# Patient Record
Sex: FEMALE | Race: WHITE | NOT HISPANIC OR LATINO | Employment: FULL TIME | URBAN - METROPOLITAN AREA
[De-identification: names, ages, dates, MRNs, and addresses within clinical notes are randomized per-mention and may not be internally consistent; named-entity substitution may affect disease eponyms.]

---

## 2023-04-28 ENCOUNTER — OFFICE VISIT (OUTPATIENT)
Dept: PRIMARY CARE | Facility: CLINIC | Age: 43
End: 2023-04-28
Payer: COMMERCIAL

## 2023-04-28 VITALS
TEMPERATURE: 98 F | HEART RATE: 76 BPM | WEIGHT: 128 LBS | DIASTOLIC BLOOD PRESSURE: 70 MMHG | OXYGEN SATURATION: 98 % | SYSTOLIC BLOOD PRESSURE: 116 MMHG

## 2023-04-28 DIAGNOSIS — R22.41 LOCALIZED SWELLING, MASS AND LUMP, LOWER LIMB, RIGHT: Primary | ICD-10-CM

## 2023-04-28 PROBLEM — Q85.89: Status: ACTIVE | Noted: 2019-02-21

## 2023-04-28 PROBLEM — M54.81 CERVICO-OCCIPITAL NEURALGIA: Status: ACTIVE | Noted: 2023-04-28

## 2023-04-28 PROBLEM — M54.12: Status: RESOLVED | Noted: 2023-04-28 | Resolved: 2023-04-28

## 2023-04-28 PROBLEM — M54.12: Status: ACTIVE | Noted: 2023-04-28

## 2023-04-28 PROBLEM — Z15.89 MTHFR GENE MUTATION: Status: ACTIVE | Noted: 2023-04-28

## 2023-04-28 PROBLEM — Q85.89: Chronic | Status: ACTIVE | Noted: 2019-02-21

## 2023-04-28 PROCEDURE — 99213 OFFICE O/P EST LOW 20 MIN: CPT | Performed by: FAMILY MEDICINE

## 2023-04-28 PROCEDURE — 1036F TOBACCO NON-USER: CPT | Performed by: FAMILY MEDICINE

## 2023-04-28 RX ORDER — GABAPENTIN 100 MG/1
100 CAPSULE ORAL 3 TIMES DAILY
COMMUNITY

## 2023-04-28 NOTE — PROGRESS NOTES
Subjective   Patient ID: Carla Madden is a 43 y.o. female who presents for bump on right roque x 1 wk nki.    Fannie noticed a lesion on her right anterior shin.  She first noticed this about a week ago.  It is not painful.  It is not enlarged or draining any material.First noticed while walking in [place].          Review of Systems   Constitutional:  Negative for chills and fever.   HENT:  Negative for congestion.    Respiratory:  Negative for cough.        Objective   /70   Pulse 76   Temp 36.7 °C (98 °F)   Wt 58.1 kg (128 lb)   SpO2 98%     Physical Exam  Constitutional:       General: She is not in acute distress.  HENT:      Head: Normocephalic and atraumatic.   Eyes:      Extraocular Movements: Extraocular movements intact.   Pulmonary:      Effort: Pulmonary effort is normal.   Skin:     Comments: Small cyst like lesion at right anterior shin.  Not attached to bone.  Easily mobile.  No associated erythema or petechiae.   Neurological:      Mental Status: She is alert.   Psychiatric:         Mood and Affect: Mood normal.         Assessment/Plan   Problem List Items Addressed This Visit          Other    Localized swelling, mass and lump, lower limb, right - Primary     Possible dermal cyst.  We will check x-ray to ensure no bony lesion, since patient first noticed this while walking more than usual in [place].  Could also consider dermatology consult.         Relevant Orders    XR tibia fibula right 2 views

## 2023-04-30 PROBLEM — R22.41: Status: ACTIVE | Noted: 2023-04-30

## 2023-04-30 ASSESSMENT — ENCOUNTER SYMPTOMS
CHILLS: 0
FEVER: 0
COUGH: 0

## 2023-04-30 NOTE — ASSESSMENT & PLAN NOTE
Possible dermal cyst.  We will check x-ray to ensure no bony lesion, since patient first noticed this while walking more than usual in [place].  Could also consider dermatology consult.

## 2023-05-02 ENCOUNTER — HOSPITAL ENCOUNTER (OUTPATIENT)
Dept: DATA CONVERSION | Facility: HOSPITAL | Age: 43
End: 2023-05-02
Attending: INTERNAL MEDICINE | Admitting: INTERNAL MEDICINE
Payer: COMMERCIAL

## 2023-05-02 DIAGNOSIS — Z12.11 ENCOUNTER FOR SCREENING FOR MALIGNANT NEOPLASM OF COLON: ICD-10-CM

## 2023-05-02 DIAGNOSIS — K64.0 FIRST DEGREE HEMORRHOIDS: ICD-10-CM

## 2023-05-02 DIAGNOSIS — Z91.041 RADIOGRAPHIC DYE ALLERGY STATUS: ICD-10-CM

## 2023-05-08 LAB — HCG, URINE: NEGATIVE

## 2023-09-05 ENCOUNTER — TELEPHONE (OUTPATIENT)
Dept: PRIMARY CARE | Facility: CLINIC | Age: 43
End: 2023-09-05
Payer: COMMERCIAL

## 2023-09-05 NOTE — TELEPHONE ENCOUNTER
Patient is asking for Recommendation from J for a doctor for headaches from her neck pain. Was seeing Neuro in [place] and doesn't want to drive to [place]. Does [facility] know anyone around here?

## 2023-09-06 NOTE — TELEPHONE ENCOUNTER
I know neurology was managing her occipital neuralgia. Unfortunately 2 of 3 neurologists in [facility] recently retired so Dr. Crowley is only one taking patient.s If having issue with neck pain, may want to consider osteopathic manipulation which is a treatment that I can do to reduce tension in neck muscles. Physical therapy would be another option to consider. We can discuss further at office visit if she'd like.

## 2023-09-07 VITALS — WEIGHT: 125.22 LBS | HEIGHT: 66 IN | BODY MASS INDEX: 20.12 KG/M2

## 2023-09-13 ENCOUNTER — OFFICE VISIT (OUTPATIENT)
Dept: PRIMARY CARE | Facility: CLINIC | Age: 43
End: 2023-09-13
Payer: COMMERCIAL

## 2023-09-13 VITALS
WEIGHT: 125 LBS | HEART RATE: 80 BPM | DIASTOLIC BLOOD PRESSURE: 60 MMHG | TEMPERATURE: 97.8 F | BODY MASS INDEX: 20.23 KG/M2 | SYSTOLIC BLOOD PRESSURE: 98 MMHG | OXYGEN SATURATION: 100 %

## 2023-09-13 DIAGNOSIS — R51.9 FACIAL PAIN: ICD-10-CM

## 2023-09-13 DIAGNOSIS — M54.81 OCCIPITAL NEURALGIA OF LEFT SIDE: Primary | ICD-10-CM

## 2023-09-13 DIAGNOSIS — Z23 NEEDS FLU SHOT: ICD-10-CM

## 2023-09-13 DIAGNOSIS — G44.86 CERVICOGENIC HEADACHE: ICD-10-CM

## 2023-09-13 PROBLEM — G43.909 MIGRAINES: Status: ACTIVE | Noted: 2021-10-04

## 2023-09-13 PROBLEM — E55.9 VITAMIN D DEFICIENCY: Status: ACTIVE | Noted: 2021-10-04

## 2023-09-13 PROCEDURE — 90471 IMMUNIZATION ADMIN: CPT | Performed by: FAMILY MEDICINE

## 2023-09-13 PROCEDURE — 99214 OFFICE O/P EST MOD 30 MIN: CPT | Performed by: FAMILY MEDICINE

## 2023-09-13 PROCEDURE — 1036F TOBACCO NON-USER: CPT | Performed by: FAMILY MEDICINE

## 2023-09-13 PROCEDURE — 90686 IIV4 VACC NO PRSV 0.5 ML IM: CPT | Performed by: FAMILY MEDICINE

## 2023-09-13 ASSESSMENT — ENCOUNTER SYMPTOMS
DIZZINESS: 0
NUMBNESS: 0
HEADACHES: 1
WEAKNESS: 0

## 2023-09-13 NOTE — PATIENT INSTRUCTIONS
- I sent referral to Dr. Crowley   - Dr. Olivas at [facility] is another option    For PT: [facility], [address] [place] [address], [id number], [place] [phone]

## 2023-09-13 NOTE — PROGRESS NOTES
Subjective   Patient ID: Carla Madden is a 43 y.o. female who presents for Neck Pain (Discuss neck and facial pain- discuss referral to specialist) and Facial Pain.    Fannie presents to discus head pain and neck pain. She has had chronic issues with pain. Diagnosed with occipital neuralgia. Had injections in past. Was following with neuro in [place] when worked there but now needs new neurologist due to location change. Still getting pain in back of head. Also has intermittent facial pain. Told by neuro that a component of cervicogenic headache as well. Gabapentin controls symptoms most days but still has breakthrough pain.          Review of Systems   Neurological:  Positive for headaches. Negative for dizziness, weakness and numbness.       Objective   BP 98/60   Pulse 80   Temp 36.6 °C (97.8 °F)   Wt 56.7 kg (125 lb)   SpO2 100%   BMI 20.23 kg/m²     Physical Exam  Constitutional:       General: She is not in acute distress.     Appearance: Normal appearance.   HENT:      Head: Normocephalic.   Pulmonary:      Effort: Pulmonary effort is normal.   Neurological:      General: No focal deficit present.      Mental Status: She is alert.   Psychiatric:         Mood and Affect: Mood normal.         Assessment/Plan   Diagnoses and all orders for this visit:  Occipital neuralgia of left side  Comments:  Refer to new neurologist (transferring from [facility] neuro). Continue gabapentin. Ok to call for refils opf gabapentin if running out.  Orders:  -     Referral to Neurology; Future  -     Referral to Physical Therapy; Future  Facial pain  -     Referral to Neurology; Future  -     Referral to Physical Therapy; Future  Cervicogenic headache  Comments:  Refer to PT.  Orders:  -     Referral to Physical Therapy; Future  Needs flu shot  -     Flu vaccine (IIV4) age 6 months and greater, preservative free

## 2023-12-22 ENCOUNTER — APPOINTMENT (OUTPATIENT)
Dept: PRIMARY CARE | Facility: CLINIC | Age: 43
End: 2023-12-22
Payer: COMMERCIAL

## 2024-03-31 ENCOUNTER — LAB REQUISITION (OUTPATIENT)
Dept: LAB | Facility: HOSPITAL | Age: 44
End: 2024-03-31
Payer: COMMERCIAL

## 2024-03-31 DIAGNOSIS — N39.0 URINARY TRACT INFECTION, SITE NOT SPECIFIED: ICD-10-CM

## 2024-03-31 PROCEDURE — 87086 URINE CULTURE/COLONY COUNT: CPT

## 2024-04-02 LAB — BACTERIA UR CULT: NORMAL

## 2024-07-02 ENCOUNTER — HOSPITAL ENCOUNTER (OUTPATIENT)
Dept: RADIOLOGY | Facility: CLINIC | Age: 44
Discharge: HOME | End: 2024-07-02
Payer: COMMERCIAL

## 2024-07-02 VITALS — WEIGHT: 125 LBS | BODY MASS INDEX: 20.83 KG/M2 | HEIGHT: 65 IN

## 2024-07-02 DIAGNOSIS — Z12.31 SCREENING MAMMOGRAM FOR BREAST CANCER: ICD-10-CM

## 2024-07-02 PROCEDURE — 77063 BREAST TOMOSYNTHESIS BI: CPT | Performed by: RADIOLOGY

## 2024-07-02 PROCEDURE — 77067 SCR MAMMO BI INCL CAD: CPT

## 2024-07-02 PROCEDURE — 77067 SCR MAMMO BI INCL CAD: CPT | Performed by: RADIOLOGY

## 2024-07-10 DIAGNOSIS — Z13.220 SCREENING FOR HYPERLIPIDEMIA: Primary | ICD-10-CM

## 2024-07-10 DIAGNOSIS — Z13.1 SCREENING FOR DIABETES MELLITUS: ICD-10-CM

## 2024-07-10 DIAGNOSIS — Z13.0 SCREENING FOR DEFICIENCY ANEMIA: ICD-10-CM

## 2024-07-12 ENCOUNTER — OFFICE VISIT (OUTPATIENT)
Dept: PRIMARY CARE | Facility: CLINIC | Age: 44
End: 2024-07-12
Payer: COMMERCIAL

## 2024-07-12 VITALS
TEMPERATURE: 97.6 F | DIASTOLIC BLOOD PRESSURE: 78 MMHG | HEIGHT: 65 IN | OXYGEN SATURATION: 99 % | WEIGHT: 128 LBS | HEART RATE: 81 BPM | BODY MASS INDEX: 21.33 KG/M2 | SYSTOLIC BLOOD PRESSURE: 122 MMHG

## 2024-07-12 DIAGNOSIS — M54.81 OCCIPITAL NEURALGIA OF LEFT SIDE: Chronic | ICD-10-CM

## 2024-07-12 DIAGNOSIS — G44.86 CERVICOGENIC HEADACHE: Primary | ICD-10-CM

## 2024-07-12 PROCEDURE — 99214 OFFICE O/P EST MOD 30 MIN: CPT | Performed by: FAMILY MEDICINE

## 2024-07-12 PROCEDURE — 1036F TOBACCO NON-USER: CPT | Performed by: FAMILY MEDICINE

## 2024-07-12 RX ORDER — CYCLOBENZAPRINE HCL 5 MG
5 TABLET ORAL NIGHTLY PRN
Qty: 30 TABLET | Refills: 1 | Status: SHIPPED | OUTPATIENT
Start: 2024-07-12 | End: 2024-09-10

## 2024-07-12 RX ORDER — GABAPENTIN 100 MG/1
100 CAPSULE ORAL EVERY MORNING
Start: 2024-07-12

## 2024-07-12 RX ORDER — GABAPENTIN 300 MG/1
300 CAPSULE ORAL NIGHTLY
Qty: 90 CAPSULE | Refills: 1 | Status: SHIPPED | OUTPATIENT
Start: 2024-07-12 | End: 2025-01-08

## 2024-07-12 ASSESSMENT — ENCOUNTER SYMPTOMS
COUGH: 0
DIZZINESS: 0
NECK PAIN: 1
HEADACHES: 1

## 2024-07-12 NOTE — PATIENT INSTRUCTIONS
Check with your insurance to see if osteopathic manipulation is covered. CPT (procedure code): 49392

## 2024-07-12 NOTE — PROGRESS NOTES
"Subjective   Patient ID: Fannie Madden is a 44 y.o. female who presents for Neck Pain (Neck and facial pain recheck   NKI).    Fannie has been having worsening of her pain that is located in the back part of her head.  It is mostly located in her left neck and then radiates up the left side of her head.  Occasionally the pain does also impact the left side of her face.  She has been following with neurology for this issue for years.  Is trying to get back into her neurologist but was unable to get an appointment for 3 months.  She wants to catch the pain now before it gets worse.  At this time pain is worse on the left side of her neck.  Feels like a squeezing sensation.  She does take gabapentin 100 mg in the morning and 200 in the evening, but she is still having symptoms.         Review of Systems   HENT:  Negative for congestion.    Respiratory:  Negative for cough.    Musculoskeletal:  Positive for neck pain.   Neurological:  Positive for headaches. Negative for dizziness.       Objective   /78   Pulse 81   Temp 36.4 °C (97.6 °F)   Ht 1.651 m (5' 5\")   Wt 58.1 kg (128 lb)   SpO2 99%   BMI 21.30 kg/m²     Physical Exam  Constitutional:       General: She is not in acute distress.     Appearance: Normal appearance.   HENT:      Head: Normocephalic.   Pulmonary:      Effort: Pulmonary effort is normal.   Musculoskeletal:      Cervical back: Spasms (Left levator scapula.) and tenderness present. No swelling, rigidity or bony tenderness. No pain with movement. Decreased range of motion.   Skin:     General: Skin is warm and dry.   Neurological:      General: No focal deficit present.      Mental Status: She is alert.      Cranial Nerves: No cranial nerve deficit.      Motor: No weakness.      Coordination: Coordination normal.   Psychiatric:         Mood and Affect: Mood normal.         Assessment/Plan   Diagnoses and all orders for this visit:  Cervicogenic headache  Comments:  Start cyclobenzaprine " as needed.  Refer to PT.  Consider osteopathic manipulation.  Orders:  -     cyclobenzaprine (Flexeril) 5 mg tablet; Take 1 tablet (5 mg) by mouth as needed at bedtime for muscle spasms.  -     Referral to Physical Therapy; Future  Occipital neuralgia of left side  Comments:  Recommend increase evening dose of gabapentin to 300 mg.  Continue 100 mg morning.  Orders:  -     gabapentin (Neurontin) 300 mg capsule; Take 1 capsule (300 mg) by mouth once daily at bedtime.  -     Referral to Physical Therapy; Future  -     gabapentin (Neurontin) 100 mg capsule; Take 1 capsule (100 mg) by mouth once daily in the morning.

## 2024-07-23 ENCOUNTER — APPOINTMENT (OUTPATIENT)
Dept: PRIMARY CARE | Facility: CLINIC | Age: 44
End: 2024-07-23
Payer: COMMERCIAL

## 2024-07-23 VITALS
WEIGHT: 129 LBS | DIASTOLIC BLOOD PRESSURE: 68 MMHG | TEMPERATURE: 97.3 F | BODY MASS INDEX: 20.73 KG/M2 | HEART RATE: 76 BPM | SYSTOLIC BLOOD PRESSURE: 116 MMHG | HEIGHT: 66 IN | OXYGEN SATURATION: 100 %

## 2024-07-23 DIAGNOSIS — N81.6 RECTOCELE: ICD-10-CM

## 2024-07-23 DIAGNOSIS — Z13.220 SCREENING FOR HYPERLIPIDEMIA: ICD-10-CM

## 2024-07-23 DIAGNOSIS — Z23 NEED FOR HEPATITIS B VACCINATION: ICD-10-CM

## 2024-07-23 DIAGNOSIS — Z00.00 WELLNESS EXAMINATION: Primary | ICD-10-CM

## 2024-07-23 DIAGNOSIS — Z13.0 SCREENING FOR DEFICIENCY ANEMIA: ICD-10-CM

## 2024-07-23 DIAGNOSIS — Z13.1 SCREENING FOR DIABETES MELLITUS: ICD-10-CM

## 2024-07-23 DIAGNOSIS — Z23 NEED FOR MMR VACCINE: ICD-10-CM

## 2024-07-23 PROCEDURE — 90743 HEPB VACC 2 DOSE ADOLESC IM: CPT | Performed by: FAMILY MEDICINE

## 2024-07-23 PROCEDURE — 99396 PREV VISIT EST AGE 40-64: CPT | Performed by: FAMILY MEDICINE

## 2024-07-23 PROCEDURE — 90472 IMMUNIZATION ADMIN EACH ADD: CPT | Performed by: FAMILY MEDICINE

## 2024-07-23 PROCEDURE — 3008F BODY MASS INDEX DOCD: CPT | Performed by: FAMILY MEDICINE

## 2024-07-23 PROCEDURE — 1036F TOBACCO NON-USER: CPT | Performed by: FAMILY MEDICINE

## 2024-07-23 PROCEDURE — 90707 MMR VACCINE SC: CPT | Performed by: FAMILY MEDICINE

## 2024-07-23 PROCEDURE — 90471 IMMUNIZATION ADMIN: CPT | Performed by: FAMILY MEDICINE

## 2024-07-23 ASSESSMENT — ENCOUNTER SYMPTOMS
RHINORRHEA: 0
CONSTIPATION: 1
PALPITATIONS: 0
VOMITING: 0
DIZZINESS: 0
SLEEP DISTURBANCE: 0
APPETITE CHANGE: 0
SORE THROAT: 0
ABDOMINAL PAIN: 0
NAUSEA: 0
FATIGUE: 0
SHORTNESS OF BREATH: 0
DIARRHEA: 0
CHILLS: 0
FEVER: 0
VOICE CHANGE: 0
COUGH: 0
DYSURIA: 0

## 2024-07-23 ASSESSMENT — PATIENT HEALTH QUESTIONNAIRE - PHQ9
2. FEELING DOWN, DEPRESSED OR HOPELESS: NOT AT ALL
1. LITTLE INTEREST OR PLEASURE IN DOING THINGS: NOT AT ALL
SUM OF ALL RESPONSES TO PHQ9 QUESTIONS 1 AND 2: 0

## 2024-07-23 NOTE — PROGRESS NOTES
"Subjective   Patient ID: Fannie Madden is a 44 y.o. female who presents for Annual Exam (Cpe.).    Fannie presents for wellness exam. She has been feeling good. Neck and head pain improving. Only issue she has is constipation and feeling bulge in vaginal area. Sometimes has to push it back in. Eats a healthy high fiber diet.              Review of Systems   Constitutional:  Negative for appetite change, chills, fatigue and fever.   HENT:  Negative for congestion, rhinorrhea, sore throat and voice change.    Eyes:  Negative for visual disturbance.   Respiratory:  Negative for cough and shortness of breath.    Cardiovascular:  Negative for chest pain and palpitations.   Gastrointestinal:  Positive for constipation. Negative for abdominal pain, diarrhea, nausea and vomiting.   Genitourinary:  Negative for dysuria.   Skin:  Negative for rash.   Neurological:  Negative for dizziness.   Psychiatric/Behavioral:  Negative for sleep disturbance.        Objective   /68   Pulse 76   Temp 36.3 °C (97.3 °F)   Ht 1.676 m (5' 6\")   Wt 58.5 kg (129 lb)   SpO2 100%   BMI 20.82 kg/m²     Physical Exam  Constitutional:       General: She is not in acute distress.  HENT:      Head: Normocephalic and atraumatic.      Right Ear: Tympanic membrane normal.      Left Ear: Tympanic membrane normal.      Nose: No congestion or rhinorrhea.      Mouth/Throat:      Mouth: Mucous membranes are moist.      Pharynx: No oropharyngeal exudate or posterior oropharyngeal erythema.   Eyes:      Extraocular Movements: Extraocular movements intact.   Cardiovascular:      Rate and Rhythm: Normal rate and regular rhythm.      Heart sounds: No murmur heard.  Pulmonary:      Effort: Pulmonary effort is normal.      Breath sounds: No wheezing or rhonchi.   Abdominal:      General: There is no distension.      Palpations: Abdomen is soft.      Tenderness: There is no abdominal tenderness. There is no guarding or rebound.   Musculoskeletal:        "  General: No swelling or tenderness.      Cervical back: Neck supple.      Right lower leg: No edema.      Left lower leg: No edema.   Lymphadenopathy:      Cervical: No cervical adenopathy.   Skin:     General: Skin is warm and dry.   Neurological:      Mental Status: She is alert.      Cranial Nerves: No cranial nerve deficit.      Motor: No weakness.      Coordination: Coordination normal.      Gait: Gait normal.   Psychiatric:         Mood and Affect: Mood normal.         Behavior: Behavior normal.         Assessment/Plan   Diagnoses and all orders for this visit:  Wellness examination  Comments:  Mammogram, colonoscopy and Pap up-to-date. Hepatitis B and MMR boosters given today.  Rectocele  Comments:  Suspected rectocele based on symptoms. Discussed referral to UroGyn but patient would like to wait. Patient will be seeing her OBGYN for pelvic exam soon.  Need for hepatitis B vaccination  -     Hepatitis B vaccine, 20 yrs and older (RECOMBIVAX, ENGERIX)  Need for MMR vaccine  -     MMR vaccine, subcutaneous (MMR II)  Screening for hyperlipidemia  -     Lipid Panel; Future  Screening for diabetes mellitus  -     Comprehensive Metabolic Panel; Future  Screening for deficiency anemia  -     CBC and Auto Differential; Future

## 2024-09-24 ENCOUNTER — OFFICE VISIT (OUTPATIENT)
Dept: URGENT CARE | Age: 44
End: 2024-09-24
Payer: COMMERCIAL

## 2024-09-24 VITALS
HEART RATE: 84 BPM | SYSTOLIC BLOOD PRESSURE: 145 MMHG | DIASTOLIC BLOOD PRESSURE: 88 MMHG | OXYGEN SATURATION: 98 % | TEMPERATURE: 98.2 F

## 2024-09-24 DIAGNOSIS — R52 BODY ACHES: ICD-10-CM

## 2024-09-24 DIAGNOSIS — R53.83 OTHER FATIGUE: ICD-10-CM

## 2024-09-24 DIAGNOSIS — R10.12 LUQ PAIN: Primary | ICD-10-CM

## 2024-09-24 DIAGNOSIS — J02.9 SORE THROAT: ICD-10-CM

## 2024-09-24 LAB — POC RAPID MONO: NEGATIVE

## 2024-09-24 ASSESSMENT — ENCOUNTER SYMPTOMS
FEVER: 1
SORE THROAT: 1
ABDOMINAL PAIN: 1

## 2024-09-24 NOTE — PROGRESS NOTES
"Subjective   Patient ID: Carla Madden \"Ernestine" is a 44 y.o. female. They present today with a chief complaint of Abdominal Pain (LUQ pain, also sick w/ fever, sore throat).    History of Present Illness  A 44-year-old female arrives to clinic with chief complaint of left upper quadrant pain, fevers, chills, sore throat, body aches, fatigue.  The patient reports having the symptoms over the last 7 days.  She reports that her entire family has been sick however all have tested negative.  She reports no history of gastrointestinal abnormalities.  She has not taken any over-the-counter medications other than Advil yesterday.  She is here for further evaluation and health maintenance.  Abdominal Pain  Associated symptoms include a fever.       Past Medical History  Allergies as of 09/24/2024 - Reviewed 09/24/2024   Allergen Reaction Noted    Iodine Hives 04/28/2023    Iodinated contrast media Hives 08/08/2013    Sulfamethoxazole-trimethoprim Hives and Rash 03/08/2016    Tylenol [acetaminophen] Other 04/28/2023       (Not in a hospital admission)       Past Medical History:   Diagnosis Date    Calculus of kidney     Recurrent kidney stones    Genetic susceptibility to other disease     MTHFR mutation    Occipital neuralgia     Occipital neuralgia    Personal history of other diseases of the nervous system and sense organs     History of migraine       Past Surgical History:   Procedure Laterality Date    BLADDER SUSPENSION      FOOT SURGERY      LITHOTRIPSY      LYMPH NODE BIOPSY      SHOULDER SURGERY Right     TUBAL LIGATION          reports that she has never smoked. She has never used smokeless tobacco. She reports current alcohol use of about 3.0 - 4.0 standard drinks of alcohol per week. She reports that she does not use drugs.    Review of Systems  Review of Systems   Constitutional:  Positive for fever.   HENT:  Positive for sore throat.    Gastrointestinal:  Positive for abdominal pain.       Objective  "   Vitals:    09/24/24 1338   BP: 145/88   Pulse: 84   Temp: 36.8 °C (98.2 °F)   SpO2: 98%     No LMP recorded.    Physical Exam  Vitals and nursing note reviewed.   Constitutional:       Appearance: Normal appearance.   HENT:      Head: Normocephalic and atraumatic.      Right Ear: Tympanic membrane normal.      Left Ear: Tympanic membrane normal.      Nose: Nose normal.      Mouth/Throat:      Mouth: Mucous membranes are moist.      Pharynx: Oropharynx is clear.   Eyes:      Extraocular Movements: Extraocular movements intact.      Conjunctiva/sclera: Conjunctivae normal.      Pupils: Pupils are equal, round, and reactive to light.   Cardiovascular:      Rate and Rhythm: Normal rate and regular rhythm.   Pulmonary:      Effort: Pulmonary effort is normal.      Breath sounds: Normal breath sounds.   Abdominal:      General: Bowel sounds are normal.      Palpations: Abdomen is soft.   Musculoskeletal:         General: Normal range of motion.      Cervical back: Normal range of motion and neck supple.   Skin:     General: Skin is warm.      Capillary Refill: Capillary refill takes less than 2 seconds.   Neurological:      General: No focal deficit present.      Mental Status: She is alert and oriented to person, place, and time. Mental status is at baseline.   Psychiatric:         Mood and Affect: Mood normal.         Behavior: Behavior normal.         Thought Content: Thought content normal.         Judgment: Judgment normal.         Procedures    Point of Care Test & Imaging Results from this visit  Results for orders placed or performed in visit on 09/24/24   POCT Infectious mononucleosis antibody manually resulted   Result Value Ref Range    POC Rapid Mono Negative Negative      No results found.    Diagnostic study results (if any) were reviewed by JULIANN Tyler.    Assessment/Plan   Allergies, medications, history, and pertinent labs/EKGs/Imaging reviewed by JULIANN Tyler.     Medical  Decision Making  Upon initial assessment, the patient was sitting calmly in bedside chair no acute distress.    Given the results, Monospot test was completed given her symptoms.  It was negative.  I educated patient for symptom management treatment such as Tylenol, ibuprofen, Claritin, and Flonase.  Follow-up with primary care provider as they are able to do blood work, ultrasound, or CT abdomen pelvis with IV contrast.  For any worsening signs and symptoms, head to the emergency department.  Patient agrees to plan of care was discharged stable condition.    As a result of the work-up, the patient was discharged home.  she was informed of her diagnosis and instructed to come back with any concerns or worsening of condition.  she and was agreeable to the plan as discussed above.  she was given the opportunity to ask questions.  All of the patient's questions were answered.    This document was generated using the assistance of voice recognition software. If there are any errors of spelling, grammar, syntax, or meaning; please feel free to contact me directly for clarification.     Orders and Diagnoses  Diagnoses and all orders for this visit:  LUQ pain  -     POCT Infectious mononucleosis antibody manually resulted  Other fatigue  -     POCT Infectious mononucleosis antibody manually resulted  Sore throat  -     POCT Infectious mononucleosis antibody manually resulted  Body aches  -     POCT Infectious mononucleosis antibody manually resulted      Medical Admin Record      Patient disposition: Home    Electronically signed by JULIANN yTler  1:56 PM

## 2024-09-26 ENCOUNTER — LAB (OUTPATIENT)
Dept: LAB | Facility: LAB | Age: 44
End: 2024-09-26
Payer: COMMERCIAL

## 2024-09-26 DIAGNOSIS — Z13.1 SCREENING FOR DIABETES MELLITUS: ICD-10-CM

## 2024-09-26 DIAGNOSIS — Z13.0 SCREENING FOR DEFICIENCY ANEMIA: ICD-10-CM

## 2024-09-26 DIAGNOSIS — Z13.220 SCREENING FOR HYPERLIPIDEMIA: ICD-10-CM

## 2024-09-26 LAB
ALBUMIN SERPL BCP-MCNC: 4.2 G/DL (ref 3.4–5)
ALP SERPL-CCNC: 51 U/L (ref 33–110)
ALT SERPL W P-5'-P-CCNC: 7 U/L (ref 7–45)
ANION GAP SERPL CALC-SCNC: 9 MMOL/L (ref 10–20)
AST SERPL W P-5'-P-CCNC: 15 U/L (ref 9–39)
BASOPHILS # BLD AUTO: 0.01 X10*3/UL (ref 0–0.1)
BASOPHILS NFR BLD AUTO: 0.3 %
BILIRUB SERPL-MCNC: 0.5 MG/DL (ref 0–1.2)
BUN SERPL-MCNC: 10 MG/DL (ref 6–23)
CALCIUM SERPL-MCNC: 8.4 MG/DL (ref 8.6–10.3)
CHLORIDE SERPL-SCNC: 106 MMOL/L (ref 98–107)
CHOLEST SERPL-MCNC: 149 MG/DL (ref 0–199)
CHOLESTEROL/HDL RATIO: 2.4
CO2 SERPL-SCNC: 28 MMOL/L (ref 21–32)
CREAT SERPL-MCNC: 0.78 MG/DL (ref 0.5–1.05)
EGFRCR SERPLBLD CKD-EPI 2021: >90 ML/MIN/1.73M*2
EOSINOPHIL # BLD AUTO: 0.11 X10*3/UL (ref 0–0.7)
EOSINOPHIL NFR BLD AUTO: 3 %
ERYTHROCYTE [DISTWIDTH] IN BLOOD BY AUTOMATED COUNT: 11.8 % (ref 11.5–14.5)
EST. AVERAGE GLUCOSE BLD GHB EST-MCNC: 97 MG/DL
GLUCOSE SERPL-MCNC: 89 MG/DL (ref 74–99)
HBA1C MFR BLD: 5 %
HCT VFR BLD AUTO: 38.8 % (ref 36–46)
HDLC SERPL-MCNC: 62 MG/DL
HGB BLD-MCNC: 13.4 G/DL (ref 12–16)
IMM GRANULOCYTES # BLD AUTO: 0.01 X10*3/UL (ref 0–0.7)
IMM GRANULOCYTES NFR BLD AUTO: 0.3 % (ref 0–0.9)
LDLC SERPL CALC-MCNC: 73 MG/DL
LYMPHOCYTES # BLD AUTO: 0.97 X10*3/UL (ref 1.2–4.8)
LYMPHOCYTES NFR BLD AUTO: 26.4 %
MCH RBC QN AUTO: 33.4 PG (ref 26–34)
MCHC RBC AUTO-ENTMCNC: 34.5 G/DL (ref 32–36)
MCV RBC AUTO: 97 FL (ref 80–100)
MONOCYTES # BLD AUTO: 0.41 X10*3/UL (ref 0.1–1)
MONOCYTES NFR BLD AUTO: 11.1 %
NEUTROPHILS # BLD AUTO: 2.17 X10*3/UL (ref 1.2–7.7)
NEUTROPHILS NFR BLD AUTO: 58.9 %
NON HDL CHOLESTEROL: 87 MG/DL (ref 0–149)
NRBC BLD-RTO: 0 /100 WBCS (ref 0–0)
PLATELET # BLD AUTO: 217 X10*3/UL (ref 150–450)
POTASSIUM SERPL-SCNC: 3.9 MMOL/L (ref 3.5–5.3)
PROT SERPL-MCNC: 6.3 G/DL (ref 6.4–8.2)
RBC # BLD AUTO: 4.01 X10*6/UL (ref 4–5.2)
SODIUM SERPL-SCNC: 139 MMOL/L (ref 136–145)
TRIGL SERPL-MCNC: 70 MG/DL (ref 0–149)
VLDL: 14 MG/DL (ref 0–40)
WBC # BLD AUTO: 3.7 X10*3/UL (ref 4.4–11.3)

## 2024-09-26 PROCEDURE — 83036 HEMOGLOBIN GLYCOSYLATED A1C: CPT

## 2024-09-26 PROCEDURE — 80061 LIPID PANEL: CPT

## 2024-09-26 PROCEDURE — 85025 COMPLETE CBC W/AUTO DIFF WBC: CPT

## 2024-09-26 PROCEDURE — 36415 COLL VENOUS BLD VENIPUNCTURE: CPT

## 2024-09-26 PROCEDURE — 80053 COMPREHEN METABOLIC PANEL: CPT

## 2024-10-04 ENCOUNTER — OFFICE VISIT (OUTPATIENT)
Dept: PRIMARY CARE | Facility: CLINIC | Age: 44
End: 2024-10-04
Payer: COMMERCIAL

## 2024-10-04 VITALS
DIASTOLIC BLOOD PRESSURE: 74 MMHG | TEMPERATURE: 97.2 F | OXYGEN SATURATION: 98 % | BODY MASS INDEX: 20.5 KG/M2 | WEIGHT: 127 LBS | HEART RATE: 91 BPM | SYSTOLIC BLOOD PRESSURE: 122 MMHG

## 2024-10-04 DIAGNOSIS — Z23 NEEDS FLU SHOT: ICD-10-CM

## 2024-10-04 DIAGNOSIS — R10.12 LEFT UPPER QUADRANT PAIN: Primary | ICD-10-CM

## 2024-10-04 PROCEDURE — 90656 IIV3 VACC NO PRSV 0.5 ML IM: CPT | Performed by: FAMILY MEDICINE

## 2024-10-04 PROCEDURE — 1036F TOBACCO NON-USER: CPT | Performed by: FAMILY MEDICINE

## 2024-10-04 PROCEDURE — 90471 IMMUNIZATION ADMIN: CPT | Performed by: FAMILY MEDICINE

## 2024-10-04 PROCEDURE — 99213 OFFICE O/P EST LOW 20 MIN: CPT | Performed by: FAMILY MEDICINE

## 2024-10-04 ASSESSMENT — ENCOUNTER SYMPTOMS
FATIGUE: 1
NAUSEA: 0
ABDOMINAL PAIN: 1
DYSURIA: 0
CONSTIPATION: 0
FEVER: 0
DIARRHEA: 0
SHORTNESS OF BREATH: 0
VOMITING: 0
COUGH: 0
CHILLS: 0

## 2024-10-04 NOTE — PROGRESS NOTES
Subjective   Patient ID: Fannie Madden is a 44 y.o. female who presents for Flank Pain (L sided flank pain, under rib pain x 3 weeks. NKI).    Fannie has been sick for approximately 3 weeks.  At the time of her illness did have some congestion and fatigue.  Started developing pain under her left rib cage.  Pain is aching.  No nausea, vomiting, or diarrhea.  She did go to urgent care and was tested with a rapid monotest which was negative.  Following this illness, she has been having persistent aching in her left upper quadrant.  Has not been having any problems with constipation.         Review of Systems   Constitutional:  Positive for fatigue. Negative for chills and fever.   Respiratory:  Negative for cough and shortness of breath.    Cardiovascular:  Negative for chest pain.   Gastrointestinal:  Positive for abdominal pain. Negative for constipation, diarrhea, nausea and vomiting.   Genitourinary:  Negative for dysuria.       Objective   /74   Pulse 91   Temp 36.2 °C (97.2 °F)   Wt 57.6 kg (127 lb)   SpO2 98%   BMI 20.50 kg/m²     Physical Exam  Constitutional:       General: She is not in acute distress.     Appearance: Normal appearance.   HENT:      Head: Normocephalic.      Mouth/Throat:      Mouth: Mucous membranes are moist.   Eyes:      Extraocular Movements: Extraocular movements intact.      Conjunctiva/sclera: Conjunctivae normal.   Cardiovascular:      Rate and Rhythm: Normal rate and regular rhythm.      Heart sounds: No murmur heard.  Pulmonary:      Breath sounds: No wheezing or rhonchi.   Abdominal:      General: Bowel sounds are normal. There is no distension.      Palpations: Abdomen is soft.      Tenderness: There is abdominal tenderness (mild at left upper quadrant). There is no guarding or rebound.   Musculoskeletal:      Cervical back: Neck supple.   Skin:     General: Skin is warm and dry.   Neurological:      Mental Status: She is alert.   Psychiatric:         Mood and  Affect: Mood normal.         Behavior: Behavior normal.         Assessment/Plan   Diagnoses and all orders for this visit:  Left upper quadrant pain  Comments:  Check US to evalaute for splenomagely. Rapid mono negative but will consider EBV panel if splenomegaly present. Discussed avoiding contact sports.  Orders:  -     US abdomen limited; Future  Needs flu shot  -     Flu vaccine, trivalent, preservative free, age 6 months and greater (Fluraix/Fluzone/Flulaval)

## 2024-10-10 ENCOUNTER — HOSPITAL ENCOUNTER (OUTPATIENT)
Dept: RADIOLOGY | Facility: CLINIC | Age: 44
Discharge: HOME | End: 2024-10-10
Payer: COMMERCIAL

## 2024-10-10 DIAGNOSIS — R10.12 LEFT UPPER QUADRANT PAIN: ICD-10-CM

## 2024-10-10 PROCEDURE — 76705 ECHO EXAM OF ABDOMEN: CPT

## 2025-01-02 DIAGNOSIS — M54.81 OCCIPITAL NEURALGIA OF LEFT SIDE: Chronic | ICD-10-CM

## 2025-01-03 RX ORDER — GABAPENTIN 300 MG/1
300 CAPSULE ORAL NIGHTLY
Qty: 90 CAPSULE | Refills: 1 | Status: SHIPPED | OUTPATIENT
Start: 2025-01-03

## 2025-01-13 ENCOUNTER — PATIENT MESSAGE (OUTPATIENT)
Dept: PRIMARY CARE | Facility: CLINIC | Age: 45
End: 2025-01-13
Payer: COMMERCIAL

## 2025-01-13 DIAGNOSIS — M54.81 OCCIPITAL NEURALGIA OF LEFT SIDE: Chronic | ICD-10-CM

## 2025-01-15 RX ORDER — GABAPENTIN 100 MG/1
100 CAPSULE ORAL EVERY MORNING
Qty: 90 CAPSULE | Refills: 1 | Status: SHIPPED | OUTPATIENT
Start: 2025-01-15

## 2025-07-16 ENCOUNTER — APPOINTMENT (OUTPATIENT)
Dept: PRIMARY CARE | Facility: CLINIC | Age: 45
End: 2025-07-16
Payer: COMMERCIAL

## 2025-07-16 VITALS
DIASTOLIC BLOOD PRESSURE: 76 MMHG | WEIGHT: 128 LBS | TEMPERATURE: 97.4 F | HEART RATE: 80 BPM | OXYGEN SATURATION: 99 % | BODY MASS INDEX: 21.33 KG/M2 | HEIGHT: 65 IN | SYSTOLIC BLOOD PRESSURE: 124 MMHG

## 2025-07-16 DIAGNOSIS — Z12.31 ENCOUNTER FOR SCREENING MAMMOGRAM FOR MALIGNANT NEOPLASM OF BREAST: ICD-10-CM

## 2025-07-16 DIAGNOSIS — Z13.1 SCREENING FOR DIABETES MELLITUS: ICD-10-CM

## 2025-07-16 DIAGNOSIS — Z13.0 SCREENING FOR DEFICIENCY ANEMIA: ICD-10-CM

## 2025-07-16 DIAGNOSIS — Z01.818 PRE-OP EXAMINATION: Primary | ICD-10-CM

## 2025-07-16 DIAGNOSIS — R23.3 EASY BRUISING: ICD-10-CM

## 2025-07-16 DIAGNOSIS — Z13.220 SCREENING FOR HYPERLIPIDEMIA: ICD-10-CM

## 2025-07-16 PROCEDURE — 3008F BODY MASS INDEX DOCD: CPT | Performed by: FAMILY MEDICINE

## 2025-07-16 PROCEDURE — 1036F TOBACCO NON-USER: CPT | Performed by: FAMILY MEDICINE

## 2025-07-16 PROCEDURE — 99214 OFFICE O/P EST MOD 30 MIN: CPT | Performed by: FAMILY MEDICINE

## 2025-07-16 ASSESSMENT — ENCOUNTER SYMPTOMS
SORE THROAT: 0
FEVER: 0
NAUSEA: 0
RHINORRHEA: 0
CHILLS: 0
PALPITATIONS: 0
CONSTIPATION: 0
DIARRHEA: 0
HEADACHES: 0
SHORTNESS OF BREATH: 0
VOMITING: 0
DIZZINESS: 0
DYSURIA: 0
WEAKNESS: 0
ABDOMINAL PAIN: 0
APPETITE CHANGE: 0
COUGH: 0
FATIGUE: 0

## 2025-07-16 ASSESSMENT — PATIENT HEALTH QUESTIONNAIRE - PHQ9
SUM OF ALL RESPONSES TO PHQ9 QUESTIONS 1 AND 2: 0
2. FEELING DOWN, DEPRESSED OR HOPELESS: NOT AT ALL
1. LITTLE INTEREST OR PLEASURE IN DOING THINGS: NOT AT ALL

## 2025-07-16 NOTE — PROGRESS NOTES
"Subjective   Patient ID: Fannie Madden is a 45 y.o. female who presents for Pre-op Clearance (pre-op clearance right hip surg 7/29 Dr. Lezama).    Fannie is scheduled for surgery on her right hip on 7/29/25. She will be undergoing right hip arthroscopy, acetabuloplasty, labral repair, femoroplasty and capsular closer. She is feeling well and does not have any new concerns. She has good exercise capacity. Is able to go up two flights of stairs without getting winded. No dizziness, chest pain or shortness of breath. She does have known MTHFR. Her mom told her that she might have been positive for Factor V Leiden in the past, but patient is unsure. She does occasionally experience easy bruising.          Review of Systems   Constitutional:  Negative for appetite change, chills, fatigue and fever.   HENT:  Negative for congestion, rhinorrhea and sore throat.    Eyes:  Negative for visual disturbance.   Respiratory:  Negative for cough and shortness of breath.    Cardiovascular:  Negative for chest pain and palpitations.   Gastrointestinal:  Negative for abdominal pain, constipation, diarrhea, nausea and vomiting.   Genitourinary:  Negative for dysuria.   Neurological:  Negative for dizziness, syncope, weakness and headaches.       Objective   /76   Pulse 80   Temp 36.3 °C (97.4 °F)   Ht 1.651 m (5' 5\")   Wt 58.1 kg (128 lb)   SpO2 99%   BMI 21.30 kg/m²     Physical Exam  Constitutional:       General: She is not in acute distress.  HENT:      Head: Normocephalic and atraumatic.      Right Ear: Tympanic membrane normal.      Left Ear: Tympanic membrane normal.      Nose: No congestion or rhinorrhea.      Mouth/Throat:      Mouth: Mucous membranes are moist.      Pharynx: No oropharyngeal exudate or posterior oropharyngeal erythema.     Eyes:      Extraocular Movements: Extraocular movements intact.       Cardiovascular:      Rate and Rhythm: Normal rate and regular rhythm.      Heart sounds: No murmur " heard.  Pulmonary:      Effort: Pulmonary effort is normal.      Breath sounds: No wheezing or rhonchi.   Abdominal:      General: There is no distension.      Palpations: Abdomen is soft.      Tenderness: There is no abdominal tenderness. There is no guarding or rebound.     Musculoskeletal:         General: No swelling or tenderness.      Cervical back: Neck supple.      Right lower leg: No edema.      Left lower leg: No edema.   Lymphadenopathy:      Cervical: No cervical adenopathy.     Skin:     General: Skin is warm and dry.     Neurological:      Mental Status: She is alert.      Cranial Nerves: No cranial nerve deficit.      Motor: No weakness.      Coordination: Coordination normal.      Gait: Gait normal.     Psychiatric:         Mood and Affect: Mood normal.         Behavior: Behavior normal.         Assessment/Plan   Diagnoses and all orders for this visit:  Pre-op examination  Comments:  Does not meet criteria for pre-op EKG or stress test. Labs ordered.  Easy bruising  Comments:  Patient thinks may have been diagnosed with Factor V Leiden in past. Results unavailable. Repeat testing ordered.  Orders:  -     Factor V Leiden; Future  Screening for hyperlipidemia  -     Lipid Panel; Future  Screening for diabetes mellitus  -     Comprehensive Metabolic Panel; Future  -     Hemoglobin A1C; Future  Screening for deficiency anemia  -     CBC and Auto Differential; Future  Encounter for screening mammogram for malignant neoplasm of breast  -     BI mammo bilateral screening tomosynthesis; Future

## 2025-07-16 NOTE — PATIENT INSTRUCTIONS
To schedule an appointment with [facility], please visit this website: [email or web address]

## 2025-07-25 ENCOUNTER — PATIENT MESSAGE (OUTPATIENT)
Dept: PRIMARY CARE | Facility: CLINIC | Age: 45
End: 2025-07-25

## 2025-07-25 ENCOUNTER — APPOINTMENT (OUTPATIENT)
Dept: RADIOLOGY | Facility: HOSPITAL | Age: 45
End: 2025-07-25
Payer: COMMERCIAL

## 2025-07-25 VITALS — WEIGHT: 128 LBS | BODY MASS INDEX: 21.33 KG/M2 | HEIGHT: 65 IN

## 2025-07-25 DIAGNOSIS — Z12.31 ENCOUNTER FOR SCREENING MAMMOGRAM FOR MALIGNANT NEOPLASM OF BREAST: ICD-10-CM

## 2025-07-25 PROCEDURE — 77067 SCR MAMMO BI INCL CAD: CPT | Performed by: RADIOLOGY

## 2025-07-25 PROCEDURE — 77063 BREAST TOMOSYNTHESIS BI: CPT | Performed by: RADIOLOGY

## 2025-07-25 PROCEDURE — 77067 SCR MAMMO BI INCL CAD: CPT

## 2025-07-25 NOTE — PATIENT COMMUNICATION
Pt called stating that she tried to get her records for the labs by calling [facility] her self, but she couldn't get a hold of anyone.  She was wondering if Dr. DEGROOT had any other ideas.  Please advise.

## 2025-07-28 ENCOUNTER — TELEPHONE (OUTPATIENT)
Dept: PRIMARY CARE | Facility: CLINIC | Age: 45
End: 2025-07-28
Payer: COMMERCIAL

## 2025-07-28 NOTE — TELEPHONE ENCOUNTER
About t [facility] lab and pt called back, she stated results is not in her [facility] chart yet, so she doubts its back.     Her surgeon already called her, proceed or not proceed? They need to know ASAP Thx

## 2025-07-28 NOTE — TELEPHONE ENCOUNTER
Unfortunately I don't have enough information to make a recommendation. We need to wait for her blood tests results. If she is positive for Factor V, she may need pre-operative and post-operative anti-coagulation and may require blood thinners for several weeks following surgery. I'm sorry to have to delay her surgery, but this is the safest decision until we have more information (for up to fax to ortho)

## 2025-07-28 NOTE — TELEPHONE ENCOUNTER
Called pt and informed of [facility] msg.    Pt was very upset.     I called lab back, got transferred 4 times, but eventually spoke to Swetha at [facility], she states this was not drawn on 7/18. With rest of labs, it was drawn on 7/23, so at the earliest it would be 4-5 Business Days, so maybe results Wed....

## 2025-07-28 NOTE — TELEPHONE ENCOUNTER
Pt asking to make sure there's nothing preventing her from getting surgery tomorrow as [facility] was waiting for lab to come back. Pls see portal message/rajendra

## 2025-07-31 ENCOUNTER — TELEPHONE (OUTPATIENT)
Dept: PRIMARY CARE | Facility: CLINIC | Age: 45
End: 2025-07-31
Payer: COMMERCIAL

## 2025-07-31 NOTE — TELEPHONE ENCOUNTER
Pt called requesting that per her surgeon there needs to be documentation of clearance before they can schedule her surgery.     Can you addend the 7/16 pre-op to reflect the lab results and clearance?    Please advise Alondra

## 2025-07-31 NOTE — LETTER
"August 1, 2025     Carla A. Maryanne \"Fannie\"  [address] [place] [address]    Patient: Fannie Mcfarlandana   YOB: 1980   Date of Visit: 7/31/2025       Dear Dr. Lezama,    Fannie Madden is cleared for surgery. Her Factor V Leiden testing was negative. She does have the MTHFR mutation per her history. I would recommend using aspirin 81mg daily for 3 weeks following surgery.        Sincerely,           Britney Cruz, DO    "

## 2025-08-22 ENCOUNTER — TELEPHONE (OUTPATIENT)
Dept: PRIMARY CARE | Facility: CLINIC | Age: 45
End: 2025-08-22
Payer: COMMERCIAL

## 2025-08-22 DIAGNOSIS — M54.81 OCCIPITAL NEURALGIA OF LEFT SIDE: Chronic | ICD-10-CM

## 2025-08-22 RX ORDER — GABAPENTIN 100 MG/1
100 CAPSULE ORAL EVERY MORNING
Qty: 30 CAPSULE | Refills: 0 | Status: SHIPPED | OUTPATIENT
Start: 2025-08-22

## 2025-08-22 RX ORDER — GABAPENTIN 300 MG/1
300 CAPSULE ORAL NIGHTLY
Qty: 30 CAPSULE | Refills: 0 | Status: SHIPPED | OUTPATIENT
Start: 2025-08-22